# Patient Record
Sex: FEMALE
[De-identification: names, ages, dates, MRNs, and addresses within clinical notes are randomized per-mention and may not be internally consistent; named-entity substitution may affect disease eponyms.]

---

## 2017-10-01 ENCOUNTER — HOSPITAL ENCOUNTER (EMERGENCY)
Dept: HOSPITAL 31 - C.ER | Age: 37
Discharge: HOME | End: 2017-10-01
Payer: COMMERCIAL

## 2017-10-01 VITALS — DIASTOLIC BLOOD PRESSURE: 75 MMHG | TEMPERATURE: 98.9 F | SYSTOLIC BLOOD PRESSURE: 111 MMHG

## 2017-10-01 VITALS — BODY MASS INDEX: 19.8 KG/M2

## 2017-10-01 VITALS — RESPIRATION RATE: 18 BRPM | OXYGEN SATURATION: 99 % | HEART RATE: 82 BPM

## 2017-10-01 DIAGNOSIS — N39.0: Primary | ICD-10-CM

## 2017-10-01 LAB
BACTERIA #/AREA URNS HPF: (no result) /[HPF]
BILIRUB UR-MCNC: NEGATIVE MG/DL
GLUCOSE UR STRIP-MCNC: NORMAL MG/DL
KETONES UR STRIP-MCNC: (no result) MG/DL
LEUKOCYTE ESTERASE UR-ACNC: (no result) LEU/UL
PH UR STRIP: 6 [PH] (ref 5–8)
PROT UR STRIP-MCNC: (no result) MG/DL
RBC # UR STRIP: (no result) /UL
RBC #/AREA URNS HPF: 91 /HPF (ref 0–3)
SP GR UR STRIP: 1.02 (ref 1–1.03)
UROBILINOGEN UR-MCNC: NORMAL MG/DL (ref 0.2–1)
WBC #/AREA URNS HPF: 481 /HPF (ref 0–5)

## 2017-10-01 NOTE — C.PDOC
History Of Present Illness


36 yo female come in for evaluation of pain on urination gradually developed 

for psat few days. Pt reports, today noted blood in urine. Otherwise, pt denies 

fever, chills, abd. apin, N/V/D, back pain, vaginal irritation or discharges. 

Ambulate to Ed for evaluation, not in any apparent distress.


Time Seen by Provider: 10/01/17 13:36


Chief Complaint (Nursing): Female Genitourinary


History Per: Patient





Past Medical History


Reviewed: Historical Data, Nursing Documentation, Vital Signs


Vital Signs: 


 Last Vital Signs











Temp  98.9 F   10/01/17 13:24


 


Pulse  90   10/01/17 13:24


 


Resp  16   10/01/17 13:24


 


BP  111/75   10/01/17 13:24


 


Pulse Ox  100   10/01/17 13:47














- Medical History


PMH: No Chronic Diseases


Surgical History: Appendectomy (2015)





- [employer] Procedures








INJECT/INFUSE NEC (07/30/07)


LAPAROSCOP APPENDECTOMY (04/29/14)








Family History: States: Unknown Family Hx





- Social History


Hx Tobacco Use: No


Hx Alcohol Use: No


Hx Substance Use: No





- Immunization History


Hx Tetanus Toxoid Vaccination: No


Hx Influenza Vaccination: No


Hx Pneumococcal Vaccination: No





Review Of Systems


Except As Marked, All Systems Reviewed And Found Negative.


Constitutional: Negative for: Fever, Chills


ENT: Negative for: Throat Pain


Gastrointestinal: Negative for: Nausea, Vomiting, Abdominal Pain, Diarrhea


Genitourinary: Positive for: Dysuria, Frequency, Hematuria.  Negative for: 

Vaginal Discharge, Vaginal Bleeding


Musculoskeletal: Negative for: Back Pain


Skin: Negative for: Rash


Neurological: Negative for: Weakness, Numbness, Headache





Physical Exam





- Physical Exam


Appears: Well, No Acute Distress


Skin: Normal Color, Warm, Dry, No Rash


Eye(s): bilateral: PERRL


Nose: No Flaring, No Discharge


Oral Mucosa: Moist


Throat: No Erythema


Neck: Supple


Cardiovascular: Rhythm Regular


Respiratory: No Decreased Breath Sounds, No Accessory Muscle Use, No Stridor


Gastrointestinal/Abdominal: Soft, Tenderness (mild suprapubic), No Distention, 

No Guarding


Back: No CVA Tenderness


Extremity: No Swelling


Neurological/Psych: Oriented x3, Normal Speech





ED Course And Treatment


O2 Sat by Pulse Oximetry: 100


Pulse Ox Interpretation: Normal


Progress Note: On re-evaluation, pt is afebrile, hemodynamicaly stable.  Non-

toxic. Tolerate Po well in ED.  ENT: no acute findings.  neck: Supple.  ABd: 

benign.  Back: (-) CVA tenderness.  UA reults review and c/w UTI.  UCx-pending.

  Pt advised and ref. to F/u with PMD, GYN in 2-3 days for re-eavl.  return if 

any new changes.





Disposition


Counseled Patient/Family Regarding: Studies Performed, Diagnosis, Need For 

Followup, Rx Given





- Disposition


[facility] 


[facility]


Disposition: HOME/ ROUTINE


Disposition Time: 14:08


Condition: STABLE


Additional Instructions: 


ENCOURAGE FLUIDS





TAKE MEDICATION AS PRESCRIBED





CRANBERRY SUPPLEMENT





FOLLOW UP WITH GYN IN 2-3 DAYS FOR RE-EVALUATION.


RETURN TO ED IF ANY WORSENING OR NEW CHANGES.


Prescriptions: 


Cranberry Fruit Extract [Cranberry] 500 mg PO BID #14 capsule


Nitrofurantoin Macrocrystals [Macrobid] 1 cap PO BID #14 cap


Phenazopyridine [Phenazopyridine HCl] 200 mg PO Q12 #6 tab


Instructions:  Urinary Tract Infection in Women (ED)


Forms:  [facility] (English)





- Clinical Impression


Clinical Impression: 


 UTI (urinary tract infection)

## 2018-02-01 ENCOUNTER — HOSPITAL ENCOUNTER (EMERGENCY)
Dept: HOSPITAL 31 - C.ER | Age: 38
Discharge: HOME | End: 2018-02-01
Payer: COMMERCIAL

## 2018-02-01 VITALS
DIASTOLIC BLOOD PRESSURE: 65 MMHG | RESPIRATION RATE: 16 BRPM | HEART RATE: 118 BPM | TEMPERATURE: 102 F | SYSTOLIC BLOOD PRESSURE: 103 MMHG

## 2018-02-01 VITALS — BODY MASS INDEX: 19.8 KG/M2

## 2018-02-01 VITALS — OXYGEN SATURATION: 99 %

## 2018-02-01 DIAGNOSIS — J11.1: Primary | ICD-10-CM

## 2018-02-01 NOTE — C.PDOC
History Of Present Illness


36 y/o female complains of fever, chills, cough, headache, and body aches x 1 

day. Denies sore throat, n/v/d. neck stiffness. 


Time Seen by Provider: 02/01/18 18:47


Chief Complaint (Nursing): Flu-like Symptoms


History Per: Patient


History/Exam Limitations: no limitations


Onset/Duration Of Symptoms: Days


Current Symptoms Are (Timing): Still Present


Associated Symptoms: Fever, Chills.  denies: Nausea, Vomiting, Diarrhea


Severity: Moderate





Past Medical History


Reviewed: Historical Data, Nursing Documentation, Vital Signs


Vital Signs: 


 Last Vital Signs











Temp  102 F H  02/01/18 19:07


 


Pulse  118 H  02/01/18 19:07


 


Resp  16   02/01/18 19:07


 


BP  103/65   02/01/18 19:07


 


Pulse Ox  99   02/01/18 19:16














- Medical History


PMH: No Chronic Diseases


Surgical History: Appendectomy (2015)





- [employer] Procedures








INJECT/INFUSE NEC (07/30/07)


LAPAROSCOP APPENDECTOMY (04/29/14)








Family History: States: Unknown Family Hx





- Social History


Hx Tobacco Use: No


Hx Alcohol Use: No


Hx Substance Use: No





- Immunization History


Hx Tetanus Toxoid Vaccination: No


Hx Influenza Vaccination: No


Hx Pneumococcal Vaccination: No





Review Of Systems


Constitutional: Positive for: Fever, Chills, Malaise


ENT: Negative for: Throat Pain


Respiratory: Negative for: Cough


Gastrointestinal: Negative for: Nausea, Vomiting, Abdominal Pain, Diarrhea


Neurological: Negative for: Headache





Physical Exam





- Physical Exam


Appears: Non-toxic, No Acute Distress, Other (very uncomfortable)


Skin: Warm (hot), Dry


Head: Atraumatic, Normacephalic


Eye(s): bilateral: Normal Inspection


Ear(s): Bilateral: Normal


Nose: No Discharge


Oral Mucosa: Moist


Throat: No Erythema, No Exudate


Neck: Supple


Chest: Symmetrical, No Tenderness


Cardiovascular: Rhythm Irregular (tachycardiac), No Murmur


Respiratory: No Decreased Breath Sounds, No Wheezing


Gastrointestinal/Abdominal: Soft, No Tenderness


Neurological/Psych: Oriented x3, Normal Speech, Normal Cognition, Normal Motor, 

Normal Sensation





ED Course And Treatment


O2 Sat by Pulse Oximetry: 99 (RA)


Pulse Ox Interpretation: Normal





Medical Decision Making


Medical Decision Making: 





pt with flu like symptomss x 1 days, d/c with tamiflu





Disposition


Counseled Patient/Family Regarding: Diagnosis, Need For Followup, Rx Given





- Disposition


Referrals: 


Raul Haynes, VU, APN [Advanced Practice Nurse] - 


Disposition: HOME/ ROUTINE


Disposition Time: 19:13


Condition: STABLE


Additional Instructions: 


Drink lots of fluids, lots of bed rest.  Take Tylenol or motrin every 4-6 hours 

for pain or fever. Get a thermometer to check temperature.  Take tamiflu as 

prescribed. Follow up with Dr Haynes in a few days. 


Prescriptions: 


Acetaminophen [Tylenol 325mg tab] 650 mg PO Q4 #50 tab


Oseltamivir Phosphate [Tamiflu] 75 mg PO BID #10 capsule


Instructions:  Influenza (ED)


Forms:  General Discharge Instructions, [facility] (English), Work Excuse





- Clinical Impression


Clinical Impression: 


 Influenza-like illness








- PA / NP / Resident Statement


MD/DO has reviewed & agrees with the documentation as recorded.





- Scribe Statement


The provider has reviewed the documentation as recorded by the Jennifer Gipson





Provider Attestation





All medical record entries made by the Navneetibsugar were at my direction and 

personally dictated by me. I have reviewed the chart and agree that the record 

accurately reflects my personal performance of the history, physical exam, 

medical decision making, and the department course for this patient. I have 

also personally directed, reviewed, and agree with the discharge instructions 

and disposition.

## 2018-02-04 ENCOUNTER — HOSPITAL ENCOUNTER (EMERGENCY)
Dept: HOSPITAL 31 - C.ER | Age: 38
Discharge: HOME | End: 2018-02-04
Payer: COMMERCIAL

## 2018-02-04 VITALS
DIASTOLIC BLOOD PRESSURE: 69 MMHG | RESPIRATION RATE: 18 BRPM | HEART RATE: 113 BPM | SYSTOLIC BLOOD PRESSURE: 108 MMHG | TEMPERATURE: 100.3 F | OXYGEN SATURATION: 100 %

## 2018-02-04 VITALS — BODY MASS INDEX: 21.4 KG/M2

## 2018-02-04 DIAGNOSIS — J11.1: Primary | ICD-10-CM

## 2018-02-04 NOTE — C.PDOC
History Of Present Illness


37 year old female presents to the emergency department complaining of fever 

and body aches for the past 4 days. Patient took Motrin and Tylenol relieving 

her fever. Reports fever having returned after the third day of using Tamiflu. 

Denies any vomiting, diarrhea, or shortness of breath. Patient has had no 

recent travel or sick contacts. 


PMD: Dr. Raul Haynes





Chief Complaint (Nursing): Flu-like Symptoms


History Per: Patient


History/Exam Limitations: no limitations


Onset/Duration Of Symptoms: Days (x4)


Current Symptoms Are (Timing): Still Present


Associated Symptoms: Fever





Past Medical History


Reviewed: Historical Data, Nursing Documentation, Vital Signs


Vital Signs: 


 Last Vital Signs











Temp  100.3 F H  18 17:50


 


Pulse  113 H  18 17:50


 


Resp  18   18 17:50


 


BP  108/69   18 17:50


 


Pulse Ox  100   18 18:46














- Medical History


PMH: No Chronic Diseases


Surgical History: Appendectomy (), 





- [facility] Procedures








INJECT/INFUSE NEC (07)


LAPAROSCOP APPENDECTOMY (14)








Family History: States: Unknown Family Hx





- Social History


Hx Tobacco Use: No


Hx Alcohol Use: No


Hx Substance Use: No





- Immunization History


Hx Tetanus Toxoid Vaccination: No


Hx Influenza Vaccination: No


Hx Pneumococcal Vaccination: No





Review Of Systems


Except As Marked, All Systems Reviewed And Found Negative.


Constitutional: Positive for: Fever, Other (Body Aches)


Cardiovascular: Negative for: Chest Pain


Respiratory: Negative for: Shortness of Breath


Gastrointestinal: Negative for: Nausea, Vomiting, Diarrhea





Physical Exam





- Physical Exam


Appears: Non-toxic, No Acute Distress


Skin: Normal Color, Warm, Dry


Head: Atraumatic, Normacephalic


Eye(s): bilateral: Normal Inspection, PERRL, EOMI


Nose: Normal


Throat: Normal


Neck: Normal


Cardiovascular: Rhythm Regular, No Murmur


Respiratory: Normal Breath Sounds, No Accessory Muscle Use, No Wheezing


Gastrointestinal/Abdominal: Normal Exam, No Tenderness, No Distention


Back: Normal Inspection


Extremity: Normal ROM


Neurological/Psych: Oriented x3





ED Course And Treatment


O2 Sat by Pulse Oximetry: 100 (RA)


Pulse Ox Interpretation: Normal





Medical Decision Making


Medical Decision Making: 


Discharge Time: 18:36


Upon reevaluation, patient is feeling better and was discharged home. Patient 

was advised to follow up with a primary medical doctor.








Disposition





- Disposition


Referrals: 


Pierre Gale Brearonda, [Non-Staff] - 


Disposition: HOME/ ROUTINE


Disposition Time: 18:30


Condition: GOOD


Additional Instructions: 





Thank you for letting us take care of you today.  The emergency medical care 

you received today was directed at your acute symptoms. If you were prescribed 

any medication, please fill it and take as directed. It may take several days 

for your symptoms to resolve. Return to the Emergency Department if your 

symptoms worsen, do not improve, or if you have any other problems.





Please contact your doctor or call one of the physicians/clinics you have been 

referred to that are listed on the Patient Visit Information form that is 

included in your discharge packet. Bring any paperwork you were given at 

discharge with you along with any medications you are taking to your follow up 

visit. Our treatment cannot replace ongoing medical care by a primary care 

provider (PCP) outside of the emergency department.





Thank you for allowing the [facility] team to be part of your care today.














Follow up with your pediatrician in 2-3 days for re-evaluation and further 

management.


Prescriptions: 


Cyclobenzaprine [Cyclobenzaprine HCl] 10 mg PO Q8 PRN #20 tab


 PRN Reason: Muscle Spasm


Instructions:  Influenza (ED)


Forms:  [email or web address] [facility] (English), Work Excuse





- Clinical Impression


Clinical Impression: 


 Influenza








- Scribe Statement


The provider has reviewed the documentation as recorded by the Scribe


Jeanna Yusuf





All medical record entries made by the Scribe were at my direction and 

personally dictated by me. I have reviewed the chart and agree that the record 

accurately reflects my personal performance of the history, physical exam, 

medical decision making, and the department course for this patient. I have 

also personally directed, reviewed, and agree with the discharge instructions 

and disposition.

## 2018-02-06 ENCOUNTER — HOSPITAL ENCOUNTER (EMERGENCY)
Dept: HOSPITAL 14 - H.ER | Age: 38
LOS: 1 days | Discharge: HOME | End: 2018-02-07
Payer: COMMERCIAL

## 2018-02-06 VITALS — BODY MASS INDEX: 21.4 KG/M2

## 2018-02-06 VITALS — OXYGEN SATURATION: 100 %

## 2018-02-06 DIAGNOSIS — N39.0: Primary | ICD-10-CM

## 2018-02-06 DIAGNOSIS — J11.1: ICD-10-CM

## 2018-02-06 LAB
ALBUMIN SERPL-MCNC: 3.8 G/DL (ref 3.5–5)
ALBUMIN/GLOB SERPL: 1 {RATIO} (ref 1–2.1)
ALT SERPL-CCNC: 50 U/L (ref 9–52)
ANISOCYTOSIS BLD QL SMEAR: (no result)
APTT BLD: 28.7 SECONDS (ref 25.6–37.1)
AST SERPL-CCNC: 39 U/L (ref 14–36)
BACTERIA #/AREA URNS HPF: (no result) /[HPF]
BASE EXCESS BLDV CALC-SCNC: 2.8 MMOL/L (ref 0–2)
BASOPHILS # BLD AUTO: 0 K/UL (ref 0–0.2)
BASOPHILS NFR BLD: 0.2 % (ref 0–2)
BILIRUB UR-MCNC: NEGATIVE MG/DL
BUN SERPL-MCNC: 11 MG/DL (ref 7–17)
BURR CELLS BLD QL SMEAR: SLIGHT
CALCIUM SERPL-MCNC: 8.9 MG/DL (ref 8.4–10.2)
COLOR UR: YELLOW
EOSINOPHIL # BLD AUTO: 0 K/UL (ref 0–0.7)
EOSINOPHIL NFR BLD: 0.2 % (ref 0–4)
ERYTHROCYTE [DISTWIDTH] IN BLOOD BY AUTOMATED COUNT: 15 % (ref 11.5–14.5)
GFR NON-AFRICAN AMERICAN: > 60
GLUCOSE UR STRIP-MCNC: (no result) MG/DL
HGB BLD-MCNC: 10.5 G/DL (ref 12–16)
HYPOCHROMIC: SLIGHT
INR PPP: 1.2 (ref 0.9–1.2)
LEUKOCYTE ESTERASE UR-ACNC: (no result) LEU/UL
LIPASE SERPL-CCNC: 88 U/L (ref 23–300)
LYMPHOCYTE: 7 % (ref 20–50)
LYMPHOCYTES # BLD AUTO: 0.5 K/UL (ref 1–4.3)
LYMPHOCYTES NFR BLD AUTO: 5 % (ref 20–40)
MAGNESIUM SERPL-MCNC: 2.1 MG/DL (ref 1.6–2.3)
MCH RBC QN AUTO: 26.3 PG (ref 27–31)
MCHC RBC AUTO-ENTMCNC: 31.7 G/DL (ref 33–37)
MCV RBC AUTO: 83 FL (ref 81–99)
MICROCYTES BLD QL SMEAR: SLIGHT
MONOCYTE: 6 % (ref 0–10)
MONOCYTES # BLD: 0.6 K/UL (ref 0–0.8)
MONOCYTES NFR BLD: 6.2 % (ref 0–10)
NEUTROPHILS # BLD: 9.2 K/UL (ref 1.8–7)
NEUTROPHILS NFR BLD AUTO: 85 % (ref 42–75)
NEUTROPHILS NFR BLD AUTO: 88.4 % (ref 50–75)
NEUTS BAND NFR BLD: 2 % (ref 0–2)
NRBC BLD AUTO-RTO: 0 % (ref 0–0)
OVALOCYTES BLD QL SMEAR: SLIGHT
PCO2 BLDV: 40 MMHG (ref 40–60)
PH BLDV: 7.44 [PH] (ref 7.32–7.43)
PH UR STRIP: 7 [PH] (ref 5–8)
PLATELET # BLD EST: NORMAL 10*3/UL
PLATELET # BLD: 315 K/UL (ref 130–400)
PMV BLD AUTO: 8.6 FL (ref 7.2–11.7)
POIKILOCYTOSIS BLD QL SMEAR: SLIGHT
PROT UR STRIP-MCNC: 30 MG/DL
PROTHROMBIN TIME: 12.8 SECONDS (ref 9.8–13.1)
RBC # BLD AUTO: 3.99 MIL/UL (ref 3.8–5.2)
RBC # UR STRIP: NEGATIVE /UL
SP GR UR STRIP: 1.02 (ref 1–1.03)
SQUAMOUS EPITHIAL: 3 /HPF (ref 0–5)
TOTAL CELLS COUNTED BLD: 100
URINE CLARITY: (no result)
URINE NITRATE: POSITIVE
UROBILINOGEN UR-MCNC: 4 MG/DL (ref 0.2–1)
VENOUS BLOOD FIO2: 21 %
VENOUS BLOOD GAS PO2: 29 MM/HG (ref 30–55)
WBC # BLD AUTO: 10.4 K/UL (ref 4.8–10.8)

## 2018-02-06 PROCEDURE — 71046 X-RAY EXAM CHEST 2 VIEWS: CPT

## 2018-02-06 PROCEDURE — 87430 STREP A AG IA: CPT

## 2018-02-06 PROCEDURE — 86308 HETEROPHILE ANTIBODY SCREEN: CPT

## 2018-02-06 PROCEDURE — 87086 URINE CULTURE/COLONY COUNT: CPT

## 2018-02-06 PROCEDURE — 80053 COMPREHEN METABOLIC PANEL: CPT

## 2018-02-06 PROCEDURE — 85025 COMPLETE CBC W/AUTO DIFF WBC: CPT

## 2018-02-06 PROCEDURE — 87070 CULTURE OTHR SPECIMN AEROBIC: CPT

## 2018-02-06 PROCEDURE — 84100 ASSAY OF PHOSPHORUS: CPT

## 2018-02-06 PROCEDURE — 81003 URINALYSIS AUTO W/O SCOPE: CPT

## 2018-02-06 PROCEDURE — 86850 RBC ANTIBODY SCREEN: CPT

## 2018-02-06 PROCEDURE — 81025 URINE PREGNANCY TEST: CPT

## 2018-02-06 PROCEDURE — 87804 INFLUENZA ASSAY W/OPTIC: CPT

## 2018-02-06 PROCEDURE — 83735 ASSAY OF MAGNESIUM: CPT

## 2018-02-06 PROCEDURE — 86900 BLOOD TYPING SEROLOGIC ABO: CPT

## 2018-02-06 PROCEDURE — 84703 CHORIONIC GONADOTROPIN ASSAY: CPT

## 2018-02-06 PROCEDURE — 74177 CT ABD & PELVIS W/CONTRAST: CPT

## 2018-02-06 PROCEDURE — 96361 HYDRATE IV INFUSION ADD-ON: CPT

## 2018-02-06 PROCEDURE — 96365 THER/PROPH/DIAG IV INF INIT: CPT

## 2018-02-06 PROCEDURE — 87040 BLOOD CULTURE FOR BACTERIA: CPT

## 2018-02-06 PROCEDURE — 85730 THROMBOPLASTIN TIME PARTIAL: CPT

## 2018-02-06 PROCEDURE — 85610 PROTHROMBIN TIME: CPT

## 2018-02-06 PROCEDURE — 82803 BLOOD GASES ANY COMBINATION: CPT

## 2018-02-06 PROCEDURE — 83690 ASSAY OF LIPASE: CPT

## 2018-02-06 PROCEDURE — 99282 EMERGENCY DEPT VISIT SF MDM: CPT

## 2018-02-06 PROCEDURE — 76830 TRANSVAGINAL US NON-OB: CPT

## 2018-02-06 PROCEDURE — 96375 TX/PRO/DX INJ NEW DRUG ADDON: CPT

## 2018-02-06 NOTE — ED PDOC
HPI:  Headache


Time Seen by Provider: 18 20:28


Chief Complaint (Nursing): Flu-like Symptoms


Chief Complaint (Provider): Fever


History Per: Patient


History/Exam Limitations: no limitations


Onset/Duration Of Symptoms: Days (x 1 week)


Current Symptoms Are (Timing): Still Present


Additional Complaint(s): 


  37 year old female presents to the ED complaining of fevers for the last week

, associated with body aches, headache, malaise, fatigue, nausea, and loss of 

appetite. Seen at [facility] and presumptuously diagnosed with the flu, 

patient has been taking tamiflu with no relief. She denies any cough, rhinorrhea

, sore throat, vomiting, diarrhea, rash, urinary symptoms, or vaginal 

discharge. No recent travel. 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 

has had possible sick contacts. Taking ibuprofen with relief of body aches, 

however the headache persists. She denies neck stiffness but reports some 

photophobia. 





PMD: [facility] 





Past Medical History


Reviewed: Historical Data, Nursing Documentation, Vital Signs


Vital Signs: 





 Last Vital Signs











Temp  103.4 F H  18 20:09


 


Pulse  126 H  18 20:09


 


Resp  16   18 20:09


 


BP  118/69   18 20:09


 


Pulse Ox  100   18 20:09














- Medical History


PMH: No Chronic Diseases





- Surgical History


Surgical History: Appendectomy (), 





- Family History


Family History: States: Stroke, Hypertension


Other Family History: Fibromyalgia





- Social History


Current smoker - smoking cessation education provided: No





- Immunization History


Hx Tetanus Toxoid Vaccination: No


Hx Influenza Vaccination: No


Hx Pneumococcal Vaccination: No





- Home Medications


Home Medications: 


 Ambulatory Orders











 Medication  Instructions  Recorded


 


Acetaminophen [Tylenol 325mg tab] 650 mg PO Q4 #50 tab 18


 


Oseltamivir Phosphate [Tamiflu] 75 mg PO BID #10 capsule 18


 


Cyclobenzaprine [Cyclobenzaprine 10 mg PO Q8 PRN #20 tab 18





HCl]  


 


Ibuprofen 400 mg PO PRN PRN 18














- Allergies


Allergies/Adverse Reactions: 


 Allergies











Allergy/AdvReac Type Severity Reaction Status Date / Time


 


No Known Allergies Allergy   Verified 18 20:09














Review of Systems


ROS Statement: Except As Marked, All Systems Reviewed And Found Negative (as 

per HPI, otherwise negative)


Constitutional: Positive for: Fever, Malaise, Other (Body aches, fatigue)


ENT: Negative for: Nose Discharge, Nose Congestion, Throat Pain


Respiratory: Negative for: Cough


Gastrointestinal: Positive for: Nausea, Other (Loss of appetite).  Negative for

: Vomiting, Diarrhea


Genitourinary Female: Negative for: Dysuria, Frequency, Vaginal Discharge


Skin: Negative for: Rash


Neurological: Positive for: Headache





Physical Exam





- Reviewed


Nursing Documentation Reviewed: Yes


Vital Signs Reviewed: Yes





- Physical Exam


Appears: Positive for: In Acute Distress ((+) tired-appearing, patient is 

febrile)


Head Exam: Positive for: ATRAUMATIC, NORMOCEPHALIC


Eye Exam: Positive for: EOMI, Normal appearance, PERRL


Neck: Positive for: Painless ROM, Supple (with no meningismus)


Gastrointestinal/Abdominal: Positive for: Soft, Tenderness (Exquisite right 

lower quadrant tenderness to palpation at McBurney's point)





- Laboratory Results


Result Diagrams: 


 18 22:15





 18 22:15





- ECG


O2 Sat by Pulse Oximetry: 100 (RA)


Pulse Ox Interpretation: Normal





Medical Decision Making


Medical Decision Making: 


Initial Impression: Febrile illness


Differential includes but is not limited to: influenza, pyelonephritis, 

appendicitis, dehydration, sepsis, bacteremia





Time: 21:22


Initial Plan: 


* VBG


* Blood type and screen


* CMP


* Lipase


* Magnesium


* Phosphorous


* HCG, qualitative 


* CBC


* PTT


* Prothrombin time


* Urine pregnancy


* Urine dip


* Chest x-ray


* Blood culture


* Urine culture


* Urinalysis


* Zofran 8 mg IV


* Tylenol 975 mg PO


* Toradol 15 mg IVP


* Normal saline IV 1000 ml at 1000 mls/hr


* Influenza A B


* Infectious Mononucleosis


* Rapid strep test 


* Pending CT Abd/Pelvis with PO & IV contrast





00:00


Patient signed out to Dr. Sloan pending CT Scan, reassessment, final ER 

disposition. 





--------------------------------------------------------------------------------

-----------------


Scribe Attestation:


Documented by Vero Buitrago, acting as a scribe for Xiomara Hdez MD





Provider Scribe Attestation:


All medical record entries made by the Scribe were at my direction and 

personally dictated by me. I have reviewed the chart and agree that the record 

accurately reflects my personal performance of the history, physical exam, 

medical decision making, and the department course for this patient. I have 

also personally directed, reviewed, and agree with the discharge instructions 

and disposition.








Disposition





- Clinical Impression


Clinical Impression: 


 Febrile illness








- Patient ED Disposition


Is Patient to be Admitted: Transfer of Care





- Disposition


Disposition: Transfer of Care


Disposition Time: 00:00


Condition: FAIR


Forms:  [employer] (English)


Patient Signed Over To: Maria R Sloan

## 2018-02-07 VITALS
RESPIRATION RATE: 18 BRPM | DIASTOLIC BLOOD PRESSURE: 59 MMHG | HEART RATE: 101 BPM | TEMPERATURE: 98.4 F | SYSTOLIC BLOOD PRESSURE: 100 MMHG

## 2018-02-07 NOTE — RAD
HISTORY:

fever  



COMPARISON:

4/19/2011



TECHNIQUE:

Chest PA and lateral



FINDINGS:



LUNGS:

No active pulmonary disease. Trace left perihilar peribronchial 

thickening in suggested-this appearance is unchanged 



PLEURA:

No significant pleural effusion identified. No pneumothorax apparent.



CARDIOVASCULAR:

Normal.



OSSEOUS STRUCTURES:

No significant abnormalities.



VISUALIZED UPPER ABDOMEN:

Normal.



OTHER FINDINGS:

None.



IMPRESSION:

No interval pathology noted

## 2018-02-07 NOTE — US
HISTORY:

RLQ pain, ill defined R ovary.  1/10/2018 LMP 



COMPARISON:

None available.



TECHNIQUE:

Transvaginal



FINDINGS:



UTERUS:

Measures 8.7 x 5.8 x 4.2 cm. Normal in size.  Heterogeneous 

echotexture 



No fibroid or other mass lesion seen.



ENDOMETRIUM:

Measures 9.7 mm in diameter. Unremarkable. 



CERVIX:

No cervical abnormality identified.



RIGHT OVARY:

Measures 2.0 x 1.9 x 1.7 cm. No solid mass. Normal flow. Normal well 

defined peripheral follicles noted



LEFT OVARY:

Measures 2.5 x 2.6 x 2.2 cm. No solid mass. Normal flow. Ill-defined/ 

marked complicated appearing follicles - left ovary is larger than 

the right given the clinical history of prior ill-defined right ovary 

and this current appearance in this 38-year-old female, physiological 

changes in the left ovary are favored



FREE FLUID:

No significant free fluid noted.



OTHER FINDINGS:

None. 



IMPRESSION:

Currently the right ovary is well defined and normal appearing



Currently the left ovary has mildly complicated follicles within it 

and appears larger than the right - these findings are attributed to 

physiological changes in the left ovary. 



Comments: Preliminary report per Vrad  compatible with this report.

## 2018-02-07 NOTE — CT
PROCEDURE:  CT Abdomen and Pelvis with contrast



HISTORY:

RLQ pain and fever



COMPARISON:

Comparison is made to the previous study dated 03/25/2009



TECHNIQUE:

Contrast dose: 90 mL of Omnipaque 300



Radiation dose:



Total exam DLP = 222.66 mGy-cm.



This CT exam was performed using one or more of the following dose 

reduction techniques: Automated exposure control, adjustment of the 

mA and/or kV according to patient size, and/or use of iterative 

reconstruction technique.



FINDINGS:



LOWER THORAX:

Mild bibasilar atelectasis noted. Trace pleural effusion noted 

bilaterally. . 



LIVER:

Unremarkable. No gross lesion or ductal dilatation. 



GALLBLADDER AND BILE DUCTS:

Unremarkable. 



PANCREAS:

Unremarkable. No gross lesion or ductal dilatation.



SPLEEN:

Unremarkable. 



ADRENALS:

Unremarkable. No mass. 



KIDNEYS AND URETERS:

Unremarkable. No hydronephrosis. No solid mass. 



VASCULATURE:

Unremarkable. No aortic aneurysm. 



BOWEL:

Unremarkable. No obstruction. No gross mural thickening. 



APPENDIX:

No evidence of appendicitis. 



PERITONEUM:

Small amount of free fluid in the pelvis noted. . No evidence of free 

air or free fluid in the abdomen. 



LYMPH NODES:

Unremarkable. No enlarged lymph nodes. 



BLADDER:

Unremarkable. 



REPRODUCTIVE:

The uterus is heterogeneous mildly enlarged may contains small 

fibroid. The adnexa are mildly enlarged.  There is hypodensity noted 

in the right ovary with adjacent fluid.  Findings may represent 

recently rupture right follicle or cyst. 



BONES:

No acute fracture. 



OTHER FINDINGS:

None.



IMPRESSION:

No evidence of cholecystitis pancreatitis or appendicitis.



Trace bilateral pleural effusion noted at the lung bases of uncertain 

etiology.



Prominent size adnexa.  Possible recent rupture of cyst or follicle 

from right ovary.



Preliminary report was submitted by Jesus.

## 2018-02-07 NOTE — ED PDOC
- Laboratory Results


Result Diagrams: 


 18 22:15





 18 22:15





- ECG


O2 Sat by Pulse Oximetry: 100 (RA)





Medical Decision Making


Medical Decision Makin:00


Patient signed out to me by Dr. Hdez pending CT Scan, reassessment, and 

final ER disposition. 





3:27 AM


CT Abdomen/Pelvis w/o IV Contrast


FINDINGS:


Lower thorax: Small bilateral pleural effusions, with adjacent compressive 

atelectasis.


ABDOMEN:


Liver: No acute findings. The liver is enlarged.


Gallbladder and bile ducts: The gallbladder is decompressed. No calcified 

stones. No significant


intra- or extrahepatic biliary ductal dilation.


Pancreas: Enhances homogeneously. No ductal dilation. No discrete mass.


Spleen: No acute findings.


Adrenals: No acute findings.


Kidneys and ureters: No acute findings. No hydronephrosis or renal calculi. No 

discrete solid mass.


PELVIS:


Bladder: No acute findings.


Reproductive: Right ovary is ill-defined and of decreased attenuation with 

adjacent free fluid


extending into the deep pelvis. Involuting 24 mm cyst within the left ovary. 

Uterus is heterogeneous


and nodular.


Appendix: Surgically absent.


ABDOMEN and PELVIS:


Stomach and bowel: Oral contrast extends to the cecum, without obstruction. No 

mucosal


thickening.


Peritoneum: No significant fluid collection. No free air.


Lymph nodes: No pathologically enlarged lymph nodes.


Vasculature: Unremarkable.


Bones: No acute fracture.


IMPRESSION:


The right ovary is defined and decreased attenuation with adjacent free fluid 

extending into the deep


pelvis, for which dedicated pelvic ultrasound is suggested, if the patient is 

clinically able.


Thank you for allowing us to participate in the care of your patient.





3:37 AM


Patient made aware of (+) UA, still reporting persistent pain. Due to CT 

results will order US transvaginal to evaluate further. 





5:54 AM


US Transvaginal


FINDINGS:


Uterus/cervix: Unremarkable. No myometrial mass.


Uterus measures 8.7 x 5.8 x 4.1 cm.


Endometrial stripe measures 1.0 cm in thickness.


Right ovary: Unremarkable. Normal blood flow.


Right ovary measures 2.0 x 1.9 x 1.7 cm.


Left ovary: Unremarkable. Normal blood flow.


Left ovary measures 2.5 x 2.6 x 2.2 cm.


Free fluid: No free fluid.


IMPRESSION:


Normal pelvic ultrasound.


--------------------------------------------------------------------------------

----------------- 





U/s as above.  Patient has soft NT/ND abdomen on reevaluation and is tolerating 

po.  She has no flank tenderness.  She likely has some component of viral 

illness and uti.  Will dc with antibiotics for uti and given detailed return 

instructions.








Scribe Attestation:


Documented by Jacqueline Villalobos, acting as a scribe for Maria R Sloan MD.





Provider Scribe Attestation:


All medical record entries made by the Scribe were at my direction and 

personally dictated by me. I have reviewed the chart and agree that the record 

accurately reflects my personal performance of the history, physical exam, 

medical decision making, and the department course for this patient. I have 

also personally directed, reviewed, and agree with the discharge instructions 

and disposition.





Disposition





- Clinical Impression


Clinical Impression: 


 UTI (urinary tract infection), Influenza-like symptoms








- POA


Present On Arrival: None





- Disposition


Disposition: Routine/Home


Disposition Time: 06:00


Condition: GOOD


Additional Instructions: 


Follow-up with PMD within 2 days.  Take full course of antibiotics.  Return to 

ED if condition worsens.


Prescriptions: 


Sulfamethoxazole/Trimethoprim [Bactrim Ds Tablet] 1 each PO BID #28 tablet


Instructions:  Urinary Tract Infection in Women (DC), Viral Syndrome (ED)


Forms:  [facility] (English), [facility] ED School/Work Excuse